# Patient Record
(demographics unavailable — no encounter records)

---

## 2025-01-13 NOTE — HISTORY OF PRESENT ILLNESS
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 As per mom, she was initially breastfeeding and baby had rash. Switched to Gentlease with partial relief. Then tried Alimentum which caused NBNB emesis. Tried puramino which worsened symptoms. Mom even tried goat milk in between but then started having mucus in stools. Recently, switched to neocate as per pcp request and doing better. Has only been 4 days since the switch. Has 1 BM per day. Mom notices increased spitting up on new formula. Gaining weight.   on pepticate formula on dairy products

## 2025-02-13 NOTE — HISTORY OF PRESENT ILLNESS
[Asthma] : asthma [Allergic Rhinitis] : allergic rhinitis [Eczematous rashes] : eczematous rashes [Venom Reactions] : venom reactions [Drug Allergies] : drug allergies [de-identified] : OLIVE QUINTANA  is a 9 month old male who has been breaking out in hives on and off, mom took him to an allergist, he had skin prick test, it said he is allergic to eggs, she was advised to have a follow up visit if he continues to have reactions, mom says due to distance she stopped going to allergist. Mom says she has never introduced eggs into his diet, she wants to introduce solid foods but is concerned about possible food allergies. Mom  him but he broke out in red spots on his face, she switched to cows milk at four months old, he still broke out in red spots, she has tried soy, protein milk and Neocate, Mutramigen formula, all these formulas caused the same reaction, he would cry and not sleep. Almentum made him throw up. Mom says she has introduced fruits and vegetables; he can eat them with no problem. He has skin dryness mom wants to know if this is eczema. He is here today to know how to treat his symptoms.

## 2025-02-13 NOTE — SOCIAL HISTORY
[Apartment] : [unfilled] lives in an apartment  [Radiator/Baseboard] : heating provided by radiator(s)/baseboard(s) [Window Units] : air conditioning provided by window units [Humidifier] : does not use a humidifier [Dehumidifier] : does not use a dehumidifier [Cockroaches] : Patient states that there are no cockroaches in the home [Dust Mite Covers] : does not have dust mite covers [Feather Pillows] : does not have feather pillows [Feather Comforter] : does not have a feather comforter [Bedroom] : not in the bedroom [Basement] : not in the basement [Living Area] : not in the living area [None] : none [Smokers in Household] : there are no smokers in the home [de-identified] : when sick  [de-identified] : n/a [de-identified] : n/a